# Patient Record
Sex: MALE | URBAN - METROPOLITAN AREA
[De-identification: names, ages, dates, MRNs, and addresses within clinical notes are randomized per-mention and may not be internally consistent; named-entity substitution may affect disease eponyms.]

---

## 2022-08-04 ENCOUNTER — APPOINTMENT (RX ONLY)
Dept: URBAN - METROPOLITAN AREA CLINIC 31 | Facility: CLINIC | Age: 37
Setting detail: DERMATOLOGY
End: 2022-08-04

## 2022-08-04 DIAGNOSIS — D18.0 HEMANGIOMA: ICD-10-CM

## 2022-08-04 DIAGNOSIS — B35.6 TINEA CRURIS: ICD-10-CM

## 2022-08-04 DIAGNOSIS — Z71.89 OTHER SPECIFIED COUNSELING: ICD-10-CM

## 2022-08-04 DIAGNOSIS — B37.42 CANDIDAL BALANITIS: ICD-10-CM

## 2022-08-04 DIAGNOSIS — L82.1 OTHER SEBORRHEIC KERATOSIS: ICD-10-CM

## 2022-08-04 DIAGNOSIS — L81.4 OTHER MELANIN HYPERPIGMENTATION: ICD-10-CM

## 2022-08-04 DIAGNOSIS — B07.8 OTHER VIRAL WARTS: ICD-10-CM

## 2022-08-04 PROBLEM — D18.01 HEMANGIOMA OF SKIN AND SUBCUTANEOUS TISSUE: Status: ACTIVE | Noted: 2022-08-04

## 2022-08-04 PROBLEM — D23.5 OTHER BENIGN NEOPLASM OF SKIN OF TRUNK: Status: ACTIVE | Noted: 2022-08-04

## 2022-08-04 PROBLEM — D29.4 BENIGN NEOPLASM OF SCROTUM: Status: ACTIVE | Noted: 2022-08-04

## 2022-08-04 PROBLEM — D23.62 OTHER BENIGN NEOPLASM OF SKIN OF LEFT UPPER LIMB, INCLUDING SHOULDER: Status: ACTIVE | Noted: 2022-08-04

## 2022-08-04 PROBLEM — D48.5 NEOPLASM OF UNCERTAIN BEHAVIOR OF SKIN: Status: ACTIVE | Noted: 2022-08-04

## 2022-08-04 PROCEDURE — ? ADDITIONAL NOTES

## 2022-08-04 PROCEDURE — 99203 OFFICE O/P NEW LOW 30 MIN: CPT

## 2022-08-04 PROCEDURE — ? COUNSELING

## 2022-08-04 PROCEDURE — ? PRESCRIPTION

## 2022-08-04 RX ORDER — KETOCONAZOLE 20 MG/G
CREAM TOPICAL BID
Qty: 60 | Refills: 6 | Status: ERX | COMMUNITY
Start: 2022-08-04

## 2022-08-04 RX ADMIN — KETOCONAZOLE: 20 CREAM TOPICAL at 00:00

## 2022-08-04 ASSESSMENT — LOCATION ZONE DERM
LOCATION ZONE: NOSE
LOCATION ZONE: TRUNK
LOCATION ZONE: PENIS

## 2022-08-04 ASSESSMENT — LOCATION DETAILED DESCRIPTION DERM
LOCATION DETAILED: RIGHT INFERIOR UPPER BACK
LOCATION DETAILED: RIGHT SUPERIOR UPPER BACK
LOCATION DETAILED: DORSAL CORONAL SULCUS
LOCATION DETAILED: RIGHT NARIS

## 2022-08-04 ASSESSMENT — LOCATION SIMPLE DESCRIPTION DERM
LOCATION SIMPLE: RIGHT NOSE
LOCATION SIMPLE: PENIS
LOCATION SIMPLE: RIGHT UPPER BACK

## 2022-08-04 NOTE — PROCEDURE: ADDITIONAL NOTES
Render Risk Assessment In Note?: no
Additional Notes: Patient was instructed to see an ENT to have this lesion evaluated, to rule out skin cancer. Pt verbalized his understanding of and agreement with this plan.
Detail Level: Simple

## 2023-08-28 ENCOUNTER — APPOINTMENT (OUTPATIENT)
Dept: RADIOLOGY | Facility: IMAGING CENTER | Age: 38
End: 2023-08-28
Attending: PHYSICIAN ASSISTANT
Payer: COMMERCIAL

## 2023-08-28 ENCOUNTER — OFFICE VISIT (OUTPATIENT)
Dept: URGENT CARE | Facility: CLINIC | Age: 38
End: 2023-08-28
Payer: COMMERCIAL

## 2023-08-28 VITALS
SYSTOLIC BLOOD PRESSURE: 136 MMHG | RESPIRATION RATE: 14 BRPM | DIASTOLIC BLOOD PRESSURE: 88 MMHG | TEMPERATURE: 97.9 F | BODY MASS INDEX: 35.79 KG/M2 | OXYGEN SATURATION: 97 % | HEART RATE: 87 BPM | HEIGHT: 70 IN | WEIGHT: 250 LBS

## 2023-08-28 DIAGNOSIS — M79.644 THUMB PAIN, RIGHT: ICD-10-CM

## 2023-08-28 DIAGNOSIS — S62.639A CLOSED FRACTURE OF TUFT OF DISTAL PHALANX OF FINGER: ICD-10-CM

## 2023-08-28 PROCEDURE — 3079F DIAST BP 80-89 MM HG: CPT | Performed by: PHYSICIAN ASSISTANT

## 2023-08-28 PROCEDURE — 3075F SYST BP GE 130 - 139MM HG: CPT | Performed by: PHYSICIAN ASSISTANT

## 2023-08-28 PROCEDURE — 99203 OFFICE O/P NEW LOW 30 MIN: CPT | Performed by: PHYSICIAN ASSISTANT

## 2023-08-28 PROCEDURE — 73140 X-RAY EXAM OF FINGER(S): CPT | Mod: TC,RT | Performed by: PHYSICIAN ASSISTANT

## 2023-08-28 RX ORDER — AMOXICILLIN AND CLAVULANATE POTASSIUM 875; 125 MG/1; MG/1
1 TABLET, FILM COATED ORAL 2 TIMES DAILY
Qty: 14 TABLET | Refills: 0 | Status: SHIPPED | OUTPATIENT
Start: 2023-08-28 | End: 2023-09-04

## 2023-08-28 ASSESSMENT — ENCOUNTER SYMPTOMS: NEUROLOGICAL NEGATIVE: 1

## 2023-08-28 NOTE — PROGRESS NOTES
"  Subjective:     Carlos Whyte  is a 37 y.o. male who presents for Finger Injury (X1day, States he was sword fighting, blunt wooden sword, right thumb, states it did bleed at the time, painful )       He presents today with right thumb injury that occurred roughly 20-22 hours ago.  States that he was performing martial arts activities when a blunt wooden sword did strike him directly on the right thumb.  He was wearing gloves at the time but did begin to experience immediate pain and discomfort.  Pain is present over the proximal shaft of the thumb as well as at the tip of the thumb.  There was a subungual hematoma present.  He denies numbness or tingling.  Has limited thumb range of motion but relates this to the localized swelling over the digit     Review of Systems   Musculoskeletal:         Right thumb pain   Skin:         Subungual hematoma over the right thumb   Neurological: Negative.       No Known Allergies  History reviewed. No pertinent past medical history.     Objective:   /88   Pulse 87   Temp 36.6 °C (97.9 °F) (Temporal)   Resp 14   Ht 1.778 m (5' 10\")   Wt 113 kg (250 lb)   SpO2 97%   BMI 35.87 kg/m²   Physical Exam  Vitals and nursing note reviewed.   Constitutional:       General: He is not in acute distress.     Appearance: He is not ill-appearing or toxic-appearing.   HENT:      Head: Normocephalic.      Nose: No rhinorrhea.   Eyes:      General: No scleral icterus.     Conjunctiva/sclera: Conjunctivae normal.   Pulmonary:      Effort: Pulmonary effort is normal. No respiratory distress.      Breath sounds: No stridor.   Musculoskeletal:      Cervical back: Neck supple.      Comments: Examination of the right thumb does reveal tenderness palpation over the shaft of the proximal phalanx as well as tenderness over the distal phalanx.  No obvious bony step-off deformities noted.  Subungual hematoma present underneath the nail.   Neurological:      Mental Status: He is alert and " oriented to person, place, and time.   Psychiatric:         Mood and Affect: Mood normal.         Behavior: Behavior normal.         Thought Content: Thought content normal.         Judgment: Judgment normal.           Diagnostic testing:    Right thumb x-ray series  Radiologist IMPRESSION:     Acute small minimally displaced first distal phalangeal tuft fracture.    Assessment/Plan:     Encounter Diagnoses   Name Primary?    Closed fracture of tuft of distal phalanx of finger     Thumb pain, right         Plan for care for today's complaint includes starting the patient on Augmentin for tuft fracture.  Referral placed to orthopedics for tuft fracture.  Patient was placed in static thumb splint today.  Continue to monitor symptoms and return to urgent care or follow-up with primary care provider if symptoms remain ongoing.  Follow-up in the emergency department if symptoms become severe, ER precautions discussed in office today..  Prescription for Augmentin provided.    See AVS Instructions below for written guidance provided to patient on after-visit management and care in addition to our verbal discussion during the visit.    Please note that this dictation was created using voice recognition software. I have attempted to correct all errors, but there may be sound-alike, spelling, grammar and possibly content errors that I did not discover before finalizing the note.    Tom Fall PA-C

## 2023-09-25 PROBLEM — S62.521B OPEN DISPLACED FRACTURE OF DISTAL PHALANX OF RIGHT THUMB: Status: ACTIVE | Noted: 2023-09-25

## 2023-11-06 PROBLEM — M79.641 RIGHT HAND PAIN: Status: ACTIVE | Noted: 2023-11-06

## 2024-06-06 ENCOUNTER — ANESTHESIA EVENT (OUTPATIENT)
Dept: SURGERY | Facility: MEDICAL CENTER | Age: 39
End: 2024-06-06
Payer: COMMERCIAL

## 2024-06-06 ENCOUNTER — HOSPITAL ENCOUNTER (OUTPATIENT)
Facility: MEDICAL CENTER | Age: 39
End: 2024-06-07
Attending: STUDENT IN AN ORGANIZED HEALTH CARE EDUCATION/TRAINING PROGRAM | Admitting: SURGERY
Payer: COMMERCIAL

## 2024-06-06 ENCOUNTER — APPOINTMENT (OUTPATIENT)
Dept: RADIOLOGY | Facility: MEDICAL CENTER | Age: 39
End: 2024-06-06
Attending: STUDENT IN AN ORGANIZED HEALTH CARE EDUCATION/TRAINING PROGRAM
Payer: COMMERCIAL

## 2024-06-06 ENCOUNTER — ANESTHESIA (OUTPATIENT)
Dept: SURGERY | Facility: MEDICAL CENTER | Age: 39
End: 2024-06-06
Payer: COMMERCIAL

## 2024-06-06 DIAGNOSIS — K35.80 ACUTE APPENDICITIS, UNSPECIFIED ACUTE APPENDICITIS TYPE: ICD-10-CM

## 2024-06-06 DIAGNOSIS — G89.18 POSTOPERATIVE PAIN: ICD-10-CM

## 2024-06-06 LAB
ALBUMIN SERPL BCP-MCNC: 4.1 G/DL (ref 3.2–4.9)
ALBUMIN/GLOB SERPL: 1.2 G/DL
ALP SERPL-CCNC: 103 U/L (ref 30–99)
ALT SERPL-CCNC: 22 U/L (ref 2–50)
ANION GAP SERPL CALC-SCNC: 14 MMOL/L (ref 7–16)
APPEARANCE UR: CLEAR
AST SERPL-CCNC: 13 U/L (ref 12–45)
BACTERIA #/AREA URNS HPF: NEGATIVE /HPF
BASOPHILS # BLD AUTO: 0.2 % (ref 0–1.8)
BASOPHILS # BLD: 0.04 K/UL (ref 0–0.12)
BILIRUB SERPL-MCNC: 0.9 MG/DL (ref 0.1–1.5)
BILIRUB UR QL STRIP.AUTO: NEGATIVE
BUN SERPL-MCNC: 14 MG/DL (ref 8–22)
CALCIUM ALBUM COR SERPL-MCNC: 9 MG/DL (ref 8.5–10.5)
CALCIUM SERPL-MCNC: 9.1 MG/DL (ref 8.4–10.2)
CHLORIDE SERPL-SCNC: 102 MMOL/L (ref 96–112)
CO2 SERPL-SCNC: 22 MMOL/L (ref 20–33)
COLOR UR: YELLOW
CREAT SERPL-MCNC: 0.86 MG/DL (ref 0.5–1.4)
EOSINOPHIL # BLD AUTO: 0 K/UL (ref 0–0.51)
EOSINOPHIL NFR BLD: 0 % (ref 0–6.9)
EPI CELLS #/AREA URNS HPF: NEGATIVE /HPF
ERYTHROCYTE [DISTWIDTH] IN BLOOD BY AUTOMATED COUNT: 39.1 FL (ref 35.9–50)
GFR SERPLBLD CREATININE-BSD FMLA CKD-EPI: 112 ML/MIN/1.73 M 2
GLOBULIN SER CALC-MCNC: 3.3 G/DL (ref 1.9–3.5)
GLUCOSE SERPL-MCNC: 108 MG/DL (ref 65–99)
GLUCOSE UR STRIP.AUTO-MCNC: NEGATIVE MG/DL
HCT VFR BLD AUTO: 46.7 % (ref 42–52)
HGB BLD-MCNC: 15.8 G/DL (ref 14–18)
IMM GRANULOCYTES # BLD AUTO: 0.11 K/UL (ref 0–0.11)
IMM GRANULOCYTES NFR BLD AUTO: 0.6 % (ref 0–0.9)
KETONES UR STRIP.AUTO-MCNC: NEGATIVE MG/DL
LEUKOCYTE ESTERASE UR QL STRIP.AUTO: NEGATIVE
LIPASE SERPL-CCNC: 15 U/L (ref 11–82)
LYMPHOCYTES # BLD AUTO: 1.32 K/UL (ref 1–4.8)
LYMPHOCYTES NFR BLD: 7.3 % (ref 22–41)
MCH RBC QN AUTO: 30 PG (ref 27–33)
MCHC RBC AUTO-ENTMCNC: 33.8 G/DL (ref 32.3–36.5)
MCV RBC AUTO: 88.6 FL (ref 81.4–97.8)
MICRO URNS: ABNORMAL
MONOCYTES # BLD AUTO: 1.1 K/UL (ref 0–0.85)
MONOCYTES NFR BLD AUTO: 6.1 % (ref 0–13.4)
MUCOUS THREADS #/AREA URNS HPF: ABNORMAL /HPF
NEUTROPHILS # BLD AUTO: 15.53 K/UL (ref 1.82–7.42)
NEUTROPHILS NFR BLD: 85.8 % (ref 44–72)
NITRITE UR QL STRIP.AUTO: NEGATIVE
NRBC # BLD AUTO: 0 K/UL
NRBC BLD-RTO: 0 /100 WBC (ref 0–0.2)
PATHOLOGY CONSULT NOTE: NORMAL
PH UR STRIP.AUTO: 5.5 [PH] (ref 5–8)
PLATELET # BLD AUTO: 294 K/UL (ref 164–446)
PMV BLD AUTO: 9.9 FL (ref 9–12.9)
POTASSIUM SERPL-SCNC: 3.8 MMOL/L (ref 3.6–5.5)
PROT SERPL-MCNC: 7.4 G/DL (ref 6–8.2)
PROT UR QL STRIP: NEGATIVE MG/DL
RBC # BLD AUTO: 5.27 M/UL (ref 4.7–6.1)
RBC # URNS HPF: ABNORMAL /HPF
RBC UR QL AUTO: ABNORMAL
SODIUM SERPL-SCNC: 138 MMOL/L (ref 135–145)
SP GR UR STRIP.AUTO: >=1.03
UNIDENT CRYS URNS QL MICRO: ABNORMAL /HPF
WBC # BLD AUTO: 18.1 K/UL (ref 4.8–10.8)
WBC #/AREA URNS HPF: ABNORMAL /HPF

## 2024-06-06 PROCEDURE — 160035 HCHG PACU - 1ST 60 MINS PHASE I: Performed by: SURGERY

## 2024-06-06 PROCEDURE — 160029 HCHG SURGERY MINUTES - 1ST 30 MINS LEVEL 4: Performed by: SURGERY

## 2024-06-06 PROCEDURE — 700105 HCHG RX REV CODE 258: Performed by: ANESTHESIOLOGY

## 2024-06-06 PROCEDURE — 87102 FUNGUS ISOLATION CULTURE: CPT

## 2024-06-06 PROCEDURE — G0378 HOSPITAL OBSERVATION PER HR: HCPCS

## 2024-06-06 PROCEDURE — 700105 HCHG RX REV CODE 258: Performed by: STUDENT IN AN ORGANIZED HEALTH CARE EDUCATION/TRAINING PROGRAM

## 2024-06-06 PROCEDURE — 160048 HCHG OR STATISTICAL LEVEL 1-5: Performed by: SURGERY

## 2024-06-06 PROCEDURE — 700105 HCHG RX REV CODE 258: Performed by: SURGERY

## 2024-06-06 PROCEDURE — 700111 HCHG RX REV CODE 636 W/ 250 OVERRIDE (IP): Performed by: ANESTHESIOLOGY

## 2024-06-06 PROCEDURE — 96375 TX/PRO/DX INJ NEW DRUG ADDON: CPT

## 2024-06-06 PROCEDURE — 700101 HCHG RX REV CODE 250: Performed by: SURGERY

## 2024-06-06 PROCEDURE — 160009 HCHG ANES TIME/MIN: Performed by: SURGERY

## 2024-06-06 PROCEDURE — 94760 N-INVAS EAR/PLS OXIMETRY 1: CPT

## 2024-06-06 PROCEDURE — 700111 HCHG RX REV CODE 636 W/ 250 OVERRIDE (IP): Mod: JZ | Performed by: ANESTHESIOLOGY

## 2024-06-06 PROCEDURE — 88304 TISSUE EXAM BY PATHOLOGIST: CPT

## 2024-06-06 PROCEDURE — 160041 HCHG SURGERY MINUTES - EA ADDL 1 MIN LEVEL 4: Performed by: SURGERY

## 2024-06-06 PROCEDURE — 700111 HCHG RX REV CODE 636 W/ 250 OVERRIDE (IP): Mod: JZ | Performed by: STUDENT IN AN ORGANIZED HEALTH CARE EDUCATION/TRAINING PROGRAM

## 2024-06-06 PROCEDURE — 700111 HCHG RX REV CODE 636 W/ 250 OVERRIDE (IP): Mod: JZ | Performed by: SURGERY

## 2024-06-06 PROCEDURE — 700117 HCHG RX CONTRAST REV CODE 255: Performed by: STUDENT IN AN ORGANIZED HEALTH CARE EDUCATION/TRAINING PROGRAM

## 2024-06-06 PROCEDURE — 83690 ASSAY OF LIPASE: CPT

## 2024-06-06 PROCEDURE — 160002 HCHG RECOVERY MINUTES (STAT): Performed by: SURGERY

## 2024-06-06 PROCEDURE — 80053 COMPREHEN METABOLIC PANEL: CPT

## 2024-06-06 PROCEDURE — 74177 CT ABD & PELVIS W/CONTRAST: CPT

## 2024-06-06 PROCEDURE — 87070 CULTURE OTHR SPECIMN AEROBIC: CPT

## 2024-06-06 PROCEDURE — 87205 SMEAR GRAM STAIN: CPT

## 2024-06-06 PROCEDURE — 96365 THER/PROPH/DIAG IV INF INIT: CPT

## 2024-06-06 PROCEDURE — 81001 URINALYSIS AUTO W/SCOPE: CPT

## 2024-06-06 PROCEDURE — 99291 CRITICAL CARE FIRST HOUR: CPT

## 2024-06-06 PROCEDURE — 87075 CULTR BACTERIA EXCEPT BLOOD: CPT

## 2024-06-06 PROCEDURE — 85025 COMPLETE CBC W/AUTO DIFF WBC: CPT

## 2024-06-06 PROCEDURE — 36415 COLL VENOUS BLD VENIPUNCTURE: CPT

## 2024-06-06 PROCEDURE — 700101 HCHG RX REV CODE 250: Performed by: ANESTHESIOLOGY

## 2024-06-06 RX ORDER — HYDROMORPHONE HYDROCHLORIDE 1 MG/ML
0.5 INJECTION, SOLUTION INTRAMUSCULAR; INTRAVENOUS; SUBCUTANEOUS
Status: DISCONTINUED | OUTPATIENT
Start: 2024-06-06 | End: 2024-06-07 | Stop reason: HOSPADM

## 2024-06-06 RX ORDER — EPHEDRINE SULFATE 50 MG/ML
5 INJECTION, SOLUTION INTRAVENOUS
Status: DISCONTINUED | OUTPATIENT
Start: 2024-06-06 | End: 2024-06-06 | Stop reason: HOSPADM

## 2024-06-06 RX ORDER — HYDRALAZINE HYDROCHLORIDE 20 MG/ML
5 INJECTION INTRAMUSCULAR; INTRAVENOUS
Status: DISCONTINUED | OUTPATIENT
Start: 2024-06-06 | End: 2024-06-06 | Stop reason: HOSPADM

## 2024-06-06 RX ORDER — BUPIVACAINE HYDROCHLORIDE AND EPINEPHRINE 5; 5 MG/ML; UG/ML
INJECTION, SOLUTION EPIDURAL; INTRACAUDAL; PERINEURAL
Status: DISCONTINUED | OUTPATIENT
Start: 2024-06-06 | End: 2024-06-06 | Stop reason: HOSPADM

## 2024-06-06 RX ORDER — SODIUM CHLORIDE, SODIUM LACTATE, POTASSIUM CHLORIDE, CALCIUM CHLORIDE 600; 310; 30; 20 MG/100ML; MG/100ML; MG/100ML; MG/100ML
INJECTION, SOLUTION INTRAVENOUS CONTINUOUS
Status: DISCONTINUED | OUTPATIENT
Start: 2024-06-06 | End: 2024-06-07 | Stop reason: HOSPADM

## 2024-06-06 RX ORDER — METRONIDAZOLE 500 MG/100ML
500 INJECTION, SOLUTION INTRAVENOUS EVERY 12 HOURS
Qty: 600 ML | Refills: 0 | Status: DISCONTINUED | OUTPATIENT
Start: 2024-06-07 | End: 2024-06-07 | Stop reason: HOSPADM

## 2024-06-06 RX ORDER — OXYCODONE HYDROCHLORIDE 5 MG/1
5 TABLET ORAL
Status: DISCONTINUED | OUTPATIENT
Start: 2024-06-06 | End: 2024-06-07 | Stop reason: HOSPADM

## 2024-06-06 RX ORDER — ONDANSETRON 4 MG/1
4 TABLET, ORALLY DISINTEGRATING ORAL EVERY 4 HOURS PRN
Status: DISCONTINUED | OUTPATIENT
Start: 2024-06-06 | End: 2024-06-07 | Stop reason: HOSPADM

## 2024-06-06 RX ORDER — POLYETHYLENE GLYCOL 3350 17 G/17G
1 POWDER, FOR SOLUTION ORAL 2 TIMES DAILY
Status: DISCONTINUED | OUTPATIENT
Start: 2024-06-06 | End: 2024-06-07 | Stop reason: HOSPADM

## 2024-06-06 RX ORDER — OXYCODONE HYDROCHLORIDE 10 MG/1
10 TABLET ORAL
Status: DISCONTINUED | OUTPATIENT
Start: 2024-06-06 | End: 2024-06-07 | Stop reason: HOSPADM

## 2024-06-06 RX ORDER — ONDANSETRON 2 MG/ML
INJECTION INTRAMUSCULAR; INTRAVENOUS PRN
Status: DISCONTINUED | OUTPATIENT
Start: 2024-06-06 | End: 2024-06-06 | Stop reason: SURG

## 2024-06-06 RX ORDER — DOCUSATE SODIUM 100 MG/1
100 CAPSULE, LIQUID FILLED ORAL 2 TIMES DAILY
Status: DISCONTINUED | OUTPATIENT
Start: 2024-06-06 | End: 2024-06-07 | Stop reason: HOSPADM

## 2024-06-06 RX ORDER — SODIUM CHLORIDE, SODIUM LACTATE, POTASSIUM CHLORIDE, CALCIUM CHLORIDE 600; 310; 30; 20 MG/100ML; MG/100ML; MG/100ML; MG/100ML
1000 INJECTION, SOLUTION INTRAVENOUS ONCE
Status: COMPLETED | OUTPATIENT
Start: 2024-06-06 | End: 2024-06-06

## 2024-06-06 RX ORDER — AMOXICILLIN 250 MG
1 CAPSULE ORAL
Status: DISCONTINUED | OUTPATIENT
Start: 2024-06-06 | End: 2024-06-07 | Stop reason: HOSPADM

## 2024-06-06 RX ORDER — MEPERIDINE HYDROCHLORIDE 25 MG/ML
6.25 INJECTION INTRAMUSCULAR; INTRAVENOUS; SUBCUTANEOUS
Status: DISCONTINUED | OUTPATIENT
Start: 2024-06-06 | End: 2024-06-06 | Stop reason: HOSPADM

## 2024-06-06 RX ORDER — CEFTRIAXONE 2 G/1
2000 INJECTION, POWDER, FOR SOLUTION INTRAMUSCULAR; INTRAVENOUS ONCE
Status: COMPLETED | OUTPATIENT
Start: 2024-06-06 | End: 2024-06-06

## 2024-06-06 RX ORDER — IBUPROFEN 400 MG/1
800 TABLET ORAL 3 TIMES DAILY PRN
Status: DISCONTINUED | OUTPATIENT
Start: 2024-06-10 | End: 2024-06-07 | Stop reason: HOSPADM

## 2024-06-06 RX ORDER — SODIUM CHLORIDE, SODIUM LACTATE, POTASSIUM CHLORIDE, CALCIUM CHLORIDE 600; 310; 30; 20 MG/100ML; MG/100ML; MG/100ML; MG/100ML
INJECTION, SOLUTION INTRAVENOUS CONTINUOUS
Status: DISCONTINUED | OUTPATIENT
Start: 2024-06-06 | End: 2024-06-06

## 2024-06-06 RX ORDER — OXYCODONE HCL 5 MG/5 ML
5 SOLUTION, ORAL ORAL
Status: DISCONTINUED | OUTPATIENT
Start: 2024-06-06 | End: 2024-06-06 | Stop reason: HOSPADM

## 2024-06-06 RX ORDER — KETOROLAC TROMETHAMINE 15 MG/ML
INJECTION, SOLUTION INTRAMUSCULAR; INTRAVENOUS PRN
Status: DISCONTINUED | OUTPATIENT
Start: 2024-06-06 | End: 2024-06-06 | Stop reason: SURG

## 2024-06-06 RX ORDER — HYDROMORPHONE HYDROCHLORIDE 1 MG/ML
0.4 INJECTION, SOLUTION INTRAMUSCULAR; INTRAVENOUS; SUBCUTANEOUS
Status: DISCONTINUED | OUTPATIENT
Start: 2024-06-06 | End: 2024-06-06 | Stop reason: HOSPADM

## 2024-06-06 RX ORDER — ACETAMINOPHEN 325 MG/1
650 TABLET ORAL EVERY 6 HOURS PRN
Status: DISCONTINUED | OUTPATIENT
Start: 2024-06-12 | End: 2024-06-07 | Stop reason: HOSPADM

## 2024-06-06 RX ORDER — ENOXAPARIN SODIUM 100 MG/ML
40 INJECTION SUBCUTANEOUS DAILY
Status: DISCONTINUED | OUTPATIENT
Start: 2024-06-07 | End: 2024-06-07 | Stop reason: HOSPADM

## 2024-06-06 RX ORDER — LABETALOL HYDROCHLORIDE 5 MG/ML
5 INJECTION, SOLUTION INTRAVENOUS
Status: DISCONTINUED | OUTPATIENT
Start: 2024-06-06 | End: 2024-06-06 | Stop reason: HOSPADM

## 2024-06-06 RX ORDER — METRONIDAZOLE 500 MG/100ML
500 INJECTION, SOLUTION INTRAVENOUS ONCE
Status: COMPLETED | OUTPATIENT
Start: 2024-06-06 | End: 2024-06-06

## 2024-06-06 RX ORDER — ACETAMINOPHEN 325 MG/1
650 TABLET ORAL EVERY 6 HOURS
Status: DISCONTINUED | OUTPATIENT
Start: 2024-06-07 | End: 2024-06-07 | Stop reason: HOSPADM

## 2024-06-06 RX ORDER — AMOXICILLIN 250 MG
1 CAPSULE ORAL NIGHTLY
Status: DISCONTINUED | OUTPATIENT
Start: 2024-06-06 | End: 2024-06-07 | Stop reason: HOSPADM

## 2024-06-06 RX ORDER — DIPHENHYDRAMINE HYDROCHLORIDE 50 MG/ML
12.5 INJECTION INTRAMUSCULAR; INTRAVENOUS
Status: DISCONTINUED | OUTPATIENT
Start: 2024-06-06 | End: 2024-06-06 | Stop reason: HOSPADM

## 2024-06-06 RX ORDER — HYDROMORPHONE HYDROCHLORIDE 1 MG/ML
0.1 INJECTION, SOLUTION INTRAMUSCULAR; INTRAVENOUS; SUBCUTANEOUS
Status: DISCONTINUED | OUTPATIENT
Start: 2024-06-06 | End: 2024-06-06 | Stop reason: HOSPADM

## 2024-06-06 RX ORDER — ONDANSETRON 2 MG/ML
4 INJECTION INTRAMUSCULAR; INTRAVENOUS ONCE
Status: COMPLETED | OUTPATIENT
Start: 2024-06-06 | End: 2024-06-06

## 2024-06-06 RX ORDER — KETOROLAC TROMETHAMINE 15 MG/ML
15 INJECTION, SOLUTION INTRAMUSCULAR; INTRAVENOUS EVERY 6 HOURS
Status: DISCONTINUED | OUTPATIENT
Start: 2024-06-07 | End: 2024-06-07 | Stop reason: HOSPADM

## 2024-06-06 RX ORDER — HALOPERIDOL 5 MG/ML
1 INJECTION INTRAMUSCULAR
Status: DISCONTINUED | OUTPATIENT
Start: 2024-06-06 | End: 2024-06-06 | Stop reason: HOSPADM

## 2024-06-06 RX ORDER — DEXAMETHASONE SODIUM PHOSPHATE 4 MG/ML
INJECTION, SOLUTION INTRA-ARTICULAR; INTRALESIONAL; INTRAMUSCULAR; INTRAVENOUS; SOFT TISSUE PRN
Status: DISCONTINUED | OUTPATIENT
Start: 2024-06-06 | End: 2024-06-06 | Stop reason: SURG

## 2024-06-06 RX ORDER — ONDANSETRON 2 MG/ML
4 INJECTION INTRAMUSCULAR; INTRAVENOUS
Status: DISCONTINUED | OUTPATIENT
Start: 2024-06-06 | End: 2024-06-06 | Stop reason: HOSPADM

## 2024-06-06 RX ORDER — ONDANSETRON 2 MG/ML
4 INJECTION INTRAMUSCULAR; INTRAVENOUS EVERY 4 HOURS PRN
Status: DISCONTINUED | OUTPATIENT
Start: 2024-06-06 | End: 2024-06-07 | Stop reason: HOSPADM

## 2024-06-06 RX ORDER — ACETAMINOPHEN 10 MG/ML
1000 INJECTION, SOLUTION INTRAVENOUS ONCE
Status: COMPLETED | OUTPATIENT
Start: 2024-06-06 | End: 2024-06-06

## 2024-06-06 RX ORDER — LIDOCAINE HYDROCHLORIDE 20 MG/ML
INJECTION, SOLUTION EPIDURAL; INFILTRATION; INTRACAUDAL; PERINEURAL PRN
Status: DISCONTINUED | OUTPATIENT
Start: 2024-06-06 | End: 2024-06-06 | Stop reason: SURG

## 2024-06-06 RX ORDER — HYDROMORPHONE HYDROCHLORIDE 1 MG/ML
0.2 INJECTION, SOLUTION INTRAMUSCULAR; INTRAVENOUS; SUBCUTANEOUS
Status: DISCONTINUED | OUTPATIENT
Start: 2024-06-06 | End: 2024-06-06 | Stop reason: HOSPADM

## 2024-06-06 RX ORDER — CEFOTETAN DISODIUM 2 G/20ML
INJECTION, POWDER, FOR SOLUTION INTRAMUSCULAR; INTRAVENOUS PRN
Status: DISCONTINUED | OUTPATIENT
Start: 2024-06-06 | End: 2024-06-06 | Stop reason: SURG

## 2024-06-06 RX ORDER — OXYCODONE HCL 5 MG/5 ML
10 SOLUTION, ORAL ORAL
Status: DISCONTINUED | OUTPATIENT
Start: 2024-06-06 | End: 2024-06-06 | Stop reason: HOSPADM

## 2024-06-06 RX ORDER — ROCURONIUM BROMIDE 10 MG/ML
INJECTION, SOLUTION INTRAVENOUS PRN
Status: DISCONTINUED | OUTPATIENT
Start: 2024-06-06 | End: 2024-06-06 | Stop reason: SURG

## 2024-06-06 RX ORDER — ENEMA 19; 7 G/133ML; G/133ML
1 ENEMA RECTAL
Status: DISCONTINUED | OUTPATIENT
Start: 2024-06-06 | End: 2024-06-07 | Stop reason: HOSPADM

## 2024-06-06 RX ORDER — SODIUM CHLORIDE, SODIUM LACTATE, POTASSIUM CHLORIDE, CALCIUM CHLORIDE 600; 310; 30; 20 MG/100ML; MG/100ML; MG/100ML; MG/100ML
INJECTION, SOLUTION INTRAVENOUS CONTINUOUS
Status: DISCONTINUED | OUTPATIENT
Start: 2024-06-06 | End: 2024-06-06 | Stop reason: HOSPADM

## 2024-06-06 RX ORDER — BISACODYL 10 MG
10 SUPPOSITORY, RECTAL RECTAL
Status: DISCONTINUED | OUTPATIENT
Start: 2024-06-06 | End: 2024-06-07 | Stop reason: HOSPADM

## 2024-06-06 RX ADMIN — KETOROLAC TROMETHAMINE 15 MG: 15 INJECTION, SOLUTION INTRAMUSCULAR; INTRAVENOUS at 23:20

## 2024-06-06 RX ADMIN — SODIUM CHLORIDE, POTASSIUM CHLORIDE, SODIUM LACTATE AND CALCIUM CHLORIDE: 600; 310; 30; 20 INJECTION, SOLUTION INTRAVENOUS at 23:11

## 2024-06-06 RX ADMIN — FENTANYL CITRATE 50 MCG: 50 INJECTION, SOLUTION INTRAMUSCULAR; INTRAVENOUS at 21:50

## 2024-06-06 RX ADMIN — FENTANYL CITRATE 50 MCG: 50 INJECTION, SOLUTION INTRAMUSCULAR; INTRAVENOUS at 20:02

## 2024-06-06 RX ADMIN — SODIUM CHLORIDE, POTASSIUM CHLORIDE, SODIUM LACTATE AND CALCIUM CHLORIDE 1000 ML: 600; 310; 30; 20 INJECTION, SOLUTION INTRAVENOUS at 16:03

## 2024-06-06 RX ADMIN — DEXAMETHASONE SODIUM PHOSPHATE 4 MG: 4 INJECTION INTRA-ARTICULAR; INTRALESIONAL; INTRAMUSCULAR; INTRAVENOUS; SOFT TISSUE at 21:28

## 2024-06-06 RX ADMIN — ROCURONIUM BROMIDE 70 MG: 50 INJECTION, SOLUTION INTRAVENOUS at 21:22

## 2024-06-06 RX ADMIN — SODIUM CHLORIDE, POTASSIUM CHLORIDE, SODIUM LACTATE AND CALCIUM CHLORIDE: 600; 310; 30; 20 INJECTION, SOLUTION INTRAVENOUS at 20:02

## 2024-06-06 RX ADMIN — IOHEXOL 100 ML: 350 INJECTION, SOLUTION INTRAVENOUS at 17:00

## 2024-06-06 RX ADMIN — PROPOFOL 200 MG: 10 INJECTION, EMULSION INTRAVENOUS at 21:22

## 2024-06-06 RX ADMIN — METRONIDAZOLE 500 MG: 5 INJECTION, SOLUTION INTRAVENOUS at 17:50

## 2024-06-06 RX ADMIN — ONDANSETRON 4 MG: 2 INJECTION INTRAMUSCULAR; INTRAVENOUS at 21:57

## 2024-06-06 RX ADMIN — CEFTRIAXONE SODIUM 2000 MG: 2 INJECTION, POWDER, FOR SOLUTION INTRAMUSCULAR; INTRAVENOUS at 17:49

## 2024-06-06 RX ADMIN — FENTANYL CITRATE 50 MCG: 50 INJECTION, SOLUTION INTRAMUSCULAR; INTRAVENOUS at 22:05

## 2024-06-06 RX ADMIN — ACETAMINOPHEN 1000 MG: 1000 INJECTION INTRAVENOUS at 15:47

## 2024-06-06 RX ADMIN — CEFOTETAN DISODIUM 2 G: 2 INJECTION, POWDER, FOR SOLUTION INTRAMUSCULAR; INTRAVENOUS at 21:24

## 2024-06-06 RX ADMIN — FENTANYL CITRATE 50 MCG: 50 INJECTION, SOLUTION INTRAMUSCULAR; INTRAVENOUS at 21:41

## 2024-06-06 RX ADMIN — ONDANSETRON 4 MG: 2 INJECTION INTRAMUSCULAR; INTRAVENOUS at 15:42

## 2024-06-06 RX ADMIN — LIDOCAINE HYDROCHLORIDE 100 MG: 20 INJECTION, SOLUTION EPIDURAL; INFILTRATION; INTRACAUDAL at 21:22

## 2024-06-06 RX ADMIN — FENTANYL CITRATE 100 MCG: 50 INJECTION, SOLUTION INTRAMUSCULAR; INTRAVENOUS at 21:20

## 2024-06-06 RX ADMIN — KETOROLAC TROMETHAMINE 15 MG: 15 INJECTION, SOLUTION INTRAMUSCULAR; INTRAVENOUS at 21:57

## 2024-06-06 ASSESSMENT — PAIN SCALES - GENERAL: PAIN_LEVEL: 3

## 2024-06-06 ASSESSMENT — COGNITIVE AND FUNCTIONAL STATUS - GENERAL
MOBILITY SCORE: 24
DAILY ACTIVITIY SCORE: 24
SUGGESTED CMS G CODE MODIFIER DAILY ACTIVITY: CH
SUGGESTED CMS G CODE MODIFIER MOBILITY: CH

## 2024-06-06 ASSESSMENT — PATIENT HEALTH QUESTIONNAIRE - PHQ9
2. FEELING DOWN, DEPRESSED, IRRITABLE, OR HOPELESS: NOT AT ALL
SUM OF ALL RESPONSES TO PHQ9 QUESTIONS 1 AND 2: 0
1. LITTLE INTEREST OR PLEASURE IN DOING THINGS: NOT AT ALL

## 2024-06-06 ASSESSMENT — FIBROSIS 4 INDEX: FIB4 SCORE: 0.37

## 2024-06-06 ASSESSMENT — LIFESTYLE VARIABLES
HAVE PEOPLE ANNOYED YOU BY CRITICIZING YOUR DRINKING: NO
HAVE YOU EVER FELT YOU SHOULD CUT DOWN ON YOUR DRINKING: NO
EVER HAD A DRINK FIRST THING IN THE MORNING TO STEADY YOUR NERVES TO GET RID OF A HANGOVER: NO
TOTAL SCORE: 0
HOW MANY TIMES IN THE PAST YEAR HAVE YOU HAD 5 OR MORE DRINKS IN A DAY: 0
CONSUMPTION TOTAL: NEGATIVE
TOTAL SCORE: 0
TOTAL SCORE: 0
EVER FELT BAD OR GUILTY ABOUT YOUR DRINKING: NO
ALCOHOL_USE: NO
ON A TYPICAL DAY WHEN YOU DRINK ALCOHOL HOW MANY DRINKS DO YOU HAVE: 0
AVERAGE NUMBER OF DAYS PER WEEK YOU HAVE A DRINK CONTAINING ALCOHOL: 0

## 2024-06-06 ASSESSMENT — COPD QUESTIONNAIRES
HAVE YOU SMOKED AT LEAST 100 CIGARETTES IN YOUR ENTIRE LIFE: NO/DON'T KNOW
DURING THE PAST 4 WEEKS HOW MUCH DID YOU FEEL SHORT OF BREATH: NONE/LITTLE OF THE TIME
DO YOU EVER COUGH UP ANY MUCUS OR PHLEGM?: NO/ONLY WITH OCCASIONAL COLDS OR INFECTIONS
COPD SCREENING SCORE: 0

## 2024-06-06 ASSESSMENT — PAIN DESCRIPTION - PAIN TYPE: TYPE: ACUTE PAIN

## 2024-06-06 NOTE — ED PROVIDER NOTES
ED Provider Note    CHIEF COMPLAINT  Chief Complaint   Patient presents with    Abdominal Pain     C/o generalized abd pain and more intense RLQ abd pain starting yesterday and has been progressively worsening. +N/V, + 3 emesis this afternoon, and 1x/hr from 8pm to 0400. +fever today 100f. No abd surgical hx.        EXTERNAL RECORDS REVIEWED  Outpatient Notes orthopedic surgery note from 11/6/2023 patient seen for pain of right thumb noted to have right thumb distal phalanx tuft fracture    HPI/ROS  LIMITATION TO HISTORY     OUTSIDE HISTORIAN(S):      Carlos Whyte is a 38 y.o. male who presents with abdominal pain.  Patient says that starting yesterday he had generalized crampy abdominal pain.  Patient says that throughout the afternoon and evening he had multiple episodes of nonbloody vomiting.  Patient says that the pain is now localized to the right lower side of his abdomen.  Patient says he had subjective fever at home.  Patient denies urinary changes, diarrhea, bloody stool.  Patient denies recent trauma.  Patient denies heavy alcohol use or drug use.  Patient denies previous abdominal surgeries.    PAST MEDICAL HISTORY       SURGICAL HISTORY  patient denies any surgical history    FAMILY HISTORY  History reviewed. No pertinent family history.    SOCIAL HISTORY  Social History     Tobacco Use    Smoking status: Never    Smokeless tobacco: Never   Vaping Use    Vaping status: Never Used   Substance and Sexual Activity    Alcohol use: Not Currently    Drug use: Never    Sexual activity: Not on file       CURRENT MEDICATIONS  Home Medications       Reviewed by Erma Bai, PharmD (Pharmacist) on 06/06/24 at 1828  Med List Status: Complete     Medication Last Dose Status   Ibuprofen-Acetaminophen 125-250 MG Tab PRN Active                  Audit from Redirected Encounters    **Home medications have not yet been reviewed for this encounter**         ALLERGIES  Allergies   Allergen Reactions    Milk-Related  "Compounds Unspecified     Stomach ache       PHYSICAL EXAM  VITAL SIGNS: /82   Pulse 86   Temp 36.6 °C (97.8 °F) (Temporal)   Resp 18   Ht 1.778 m (5' 10\")   Wt 121 kg (266 lb 1.5 oz)   SpO2 98%   BMI 38.18 kg/m²    Constitutional: Alert in no apparent distress.  HENT: No signs of trauma, Bilateral external ears normal, Nose normal.   Eyes: Pupils are equal and reactive, Conjunctiva normal, Non-icteric.   Neck: Normal range of motion, No tenderness, Supple, No stridor.   Cardiovascular: Regular rate and rhythm, no murmurs.   Thorax & Lungs: Normal breath sounds, No respiratory distress, No wheezing, No chest tenderness.   Abdomen: Bowel sounds normal, Soft, tenderness in right lower quadrant, no rebound tenderness or guarding  Skin: Warm, Dry, No erythema, No rash.   Back: No bony tenderness, No CVA tenderness.   Extremities: Intact distal pulses, No edema, No tenderness, No cyanosis  Musculoskeletal: Good range of motion in all major joints. No tenderness to palpation or major deformities noted.   Neurologic: Alert , Normal motor function, Normal sensory function, No focal deficits noted.       EKG/LABS  Labs Reviewed   CBC WITH DIFFERENTIAL - Abnormal; Notable for the following components:       Result Value    WBC 18.1 (*)     Neutrophils-Polys 85.80 (*)     Lymphocytes 7.30 (*)     Neutrophils (Absolute) 15.53 (*)     Monos (Absolute) 1.10 (*)     All other components within normal limits   COMP METABOLIC PANEL - Abnormal; Notable for the following components:    Glucose 108 (*)     Alkaline Phosphatase 103 (*)     All other components within normal limits   URINALYSIS - Abnormal; Notable for the following components:    Occult Blood Trace (*)     All other components within normal limits   URINE MICROSCOPIC (W/UA) - Abnormal; Notable for the following components:    WBC Rare (*)     All other components within normal limits   LIPASE   ESTIMATED GFR           RADIOLOGY/PROCEDURES   I have " independently interpreted the diagnostic imaging associated with this visit and am waiting the final reading from the radiologist.   My preliminary interpretation is as follows: Enlarged appendix    Radiologist interpretation:  CT-ABDOMEN-PELVIS WITH   Final Result      Dilated fluid-filled appendix with surrounding inflammatory change consistent with acute appendicitis.          COURSE & MEDICAL DECISION MAKING    ASSESSMENT, COURSE AND PLAN  Care Narrative: Vital signs within normal limits.  Patient has tenderness in right lower quadrant with earlier generalized pain that is now localized there.  Concern for appendicitis will obtain CT imaging and obtain blood work to assess for hematologic or metabolic derangements.  Will start with IV acetaminophen as patient would like to avoid opioid medications for the time being.  Will initiate IV fluids and antiemetics.  Differential also includes nephrolithiasis, diverticulitis.  Patient has no testicular swelling or pain concerning for testicular torsion.    CT imaging with signs of appendicitis.  Patient dosed with antibiotics.  Spoke with Dr. Haney from general surgery with plans to take patient to the operating room and will be admitting patient.  Patient and his wife updated.  Patient reports adequate pain control at this time.            ADDITIONAL PROBLEMS MANAGED      DISPOSITION AND DISCUSSIONS  I have discussed management of the patient with the following physicians and DOROTA's: General surgery      FINAL DIAGNOSIS  1. Acute appendicitis, unspecified acute appendicitis type           Electronically signed by: Francesco Bull D.O., 6/6/2024 3:20 PM

## 2024-06-06 NOTE — ED TRIAGE NOTES
"Chief Complaint   Patient presents with    Abdominal Pain     C/o generalized abd pain and more intense RLQ abd pain starting yesterday and has been progressively worsening. +N/V, + 3 emesis this afternoon, and 1x/hr from 8pm to 0400. +fever today 100f. No abd surgical hx.      /86   Pulse 91   Temp 36.6 °C (97.8 °F) (Temporal)   Resp 16   Ht 1.778 m (5' 10\")   Wt 121 kg (266 lb 1.5 oz)   SpO2 96%   BMI 38.18 kg/m²     "

## 2024-06-07 ENCOUNTER — PHARMACY VISIT (OUTPATIENT)
Dept: PHARMACY | Facility: MEDICAL CENTER | Age: 39
End: 2024-06-07
Payer: COMMERCIAL

## 2024-06-07 VITALS
BODY MASS INDEX: 38.51 KG/M2 | WEIGHT: 268.96 LBS | HEIGHT: 70 IN | OXYGEN SATURATION: 95 % | HEART RATE: 67 BPM | DIASTOLIC BLOOD PRESSURE: 79 MMHG | RESPIRATION RATE: 16 BRPM | TEMPERATURE: 98.6 F | SYSTOLIC BLOOD PRESSURE: 128 MMHG

## 2024-06-07 PROBLEM — G89.18 POSTOPERATIVE PAIN: Status: ACTIVE | Noted: 2024-06-07

## 2024-06-07 LAB
ANION GAP SERPL CALC-SCNC: 13 MMOL/L (ref 7–16)
BASOPHILS # BLD AUTO: 0.1 % (ref 0–1.8)
BASOPHILS # BLD: 0.02 K/UL (ref 0–0.12)
BUN SERPL-MCNC: 13 MG/DL (ref 8–22)
CALCIUM SERPL-MCNC: 8.6 MG/DL (ref 8.4–10.2)
CHLORIDE SERPL-SCNC: 103 MMOL/L (ref 96–112)
CO2 SERPL-SCNC: 23 MMOL/L (ref 20–33)
CREAT SERPL-MCNC: 0.95 MG/DL (ref 0.5–1.4)
EOSINOPHIL # BLD AUTO: 0 K/UL (ref 0–0.51)
EOSINOPHIL NFR BLD: 0 % (ref 0–6.9)
ERYTHROCYTE [DISTWIDTH] IN BLOOD BY AUTOMATED COUNT: 41.1 FL (ref 35.9–50)
GFR SERPLBLD CREATININE-BSD FMLA CKD-EPI: 104 ML/MIN/1.73 M 2
GLUCOSE SERPL-MCNC: 123 MG/DL (ref 65–99)
GRAM STN SPEC: NORMAL
HCT VFR BLD AUTO: 42.4 % (ref 42–52)
HGB BLD-MCNC: 14.5 G/DL (ref 14–18)
IMM GRANULOCYTES # BLD AUTO: 0.06 K/UL (ref 0–0.11)
IMM GRANULOCYTES NFR BLD AUTO: 0.4 % (ref 0–0.9)
LYMPHOCYTES # BLD AUTO: 0.65 K/UL (ref 1–4.8)
LYMPHOCYTES NFR BLD: 4.8 % (ref 22–41)
MCH RBC QN AUTO: 30.5 PG (ref 27–33)
MCHC RBC AUTO-ENTMCNC: 34.2 G/DL (ref 32.3–36.5)
MCV RBC AUTO: 89.3 FL (ref 81.4–97.8)
MONOCYTES # BLD AUTO: 0.57 K/UL (ref 0–0.85)
MONOCYTES NFR BLD AUTO: 4.2 % (ref 0–13.4)
NEUTROPHILS # BLD AUTO: 12.33 K/UL (ref 1.82–7.42)
NEUTROPHILS NFR BLD: 90.5 % (ref 44–72)
NRBC # BLD AUTO: 0 K/UL
NRBC BLD-RTO: 0 /100 WBC (ref 0–0.2)
PLATELET # BLD AUTO: 263 K/UL (ref 164–446)
PMV BLD AUTO: 10.4 FL (ref 9–12.9)
POTASSIUM SERPL-SCNC: 4 MMOL/L (ref 3.6–5.5)
RBC # BLD AUTO: 4.75 M/UL (ref 4.7–6.1)
SIGNIFICANT IND 70042: NORMAL
SITE SITE: NORMAL
SODIUM SERPL-SCNC: 139 MMOL/L (ref 135–145)
SOURCE SOURCE: NORMAL
WBC # BLD AUTO: 13.6 K/UL (ref 4.8–10.8)

## 2024-06-07 PROCEDURE — A9270 NON-COVERED ITEM OR SERVICE: HCPCS | Performed by: SURGERY

## 2024-06-07 PROCEDURE — G0378 HOSPITAL OBSERVATION PER HR: HCPCS

## 2024-06-07 PROCEDURE — 80048 BASIC METABOLIC PNL TOTAL CA: CPT

## 2024-06-07 PROCEDURE — 700111 HCHG RX REV CODE 636 W/ 250 OVERRIDE (IP): Mod: JZ | Performed by: SURGERY

## 2024-06-07 PROCEDURE — 96376 TX/PRO/DX INJ SAME DRUG ADON: CPT

## 2024-06-07 PROCEDURE — 700102 HCHG RX REV CODE 250 W/ 637 OVERRIDE(OP): Performed by: SURGERY

## 2024-06-07 PROCEDURE — RXMED WILLOW AMBULATORY MEDICATION CHARGE: Performed by: SURGERY

## 2024-06-07 PROCEDURE — 85025 COMPLETE CBC W/AUTO DIFF WBC: CPT

## 2024-06-07 PROCEDURE — 36415 COLL VENOUS BLD VENIPUNCTURE: CPT

## 2024-06-07 PROCEDURE — 96365 THER/PROPH/DIAG IV INF INIT: CPT

## 2024-06-07 RX ORDER — IBUPROFEN 600 MG/1
600 TABLET ORAL EVERY 6 HOURS
Qty: 45 TABLET | Refills: 0 | Status: SHIPPED | OUTPATIENT
Start: 2024-06-07

## 2024-06-07 RX ORDER — OXYCODONE HYDROCHLORIDE 5 MG/1
5 TABLET ORAL EVERY 4 HOURS PRN
Qty: 12 TABLET | Refills: 0 | Status: SHIPPED | OUTPATIENT
Start: 2024-06-07 | End: 2024-06-10

## 2024-06-07 RX ORDER — ACETAMINOPHEN 325 MG/1
650 TABLET ORAL EVERY 6 HOURS
Qty: 60 TABLET | Refills: 0 | Status: SHIPPED | OUTPATIENT
Start: 2024-06-07

## 2024-06-07 RX ORDER — POLYETHYLENE GLYCOL 3350 17 G/17G
17 POWDER, FOR SOLUTION ORAL DAILY
Qty: 20 EACH | Refills: 0 | Status: SHIPPED | OUTPATIENT
Start: 2024-06-07

## 2024-06-07 RX ADMIN — ACETAMINOPHEN 650 MG: 325 TABLET ORAL at 04:29

## 2024-06-07 RX ADMIN — DOCUSATE SODIUM 100 MG: 100 CAPSULE, LIQUID FILLED ORAL at 05:35

## 2024-06-07 RX ADMIN — METRONIDAZOLE 500 MG: 5 INJECTION, SOLUTION INTRAVENOUS at 05:32

## 2024-06-07 RX ADMIN — KETOROLAC TROMETHAMINE 15 MG: 15 INJECTION, SOLUTION INTRAMUSCULAR; INTRAVENOUS at 06:00

## 2024-06-07 SDOH — ECONOMIC STABILITY: TRANSPORTATION INSECURITY
IN THE PAST 12 MONTHS, HAS THE LACK OF TRANSPORTATION KEPT YOU FROM MEDICAL APPOINTMENTS OR FROM GETTING MEDICATIONS?: NO

## 2024-06-07 SDOH — ECONOMIC STABILITY: TRANSPORTATION INSECURITY
IN THE PAST 12 MONTHS, HAS LACK OF RELIABLE TRANSPORTATION KEPT YOU FROM MEDICAL APPOINTMENTS, MEETINGS, WORK OR FROM GETTING THINGS NEEDED FOR DAILY LIVING?: NO

## 2024-06-07 ASSESSMENT — FIBROSIS 4 INDEX: FIB4 SCORE: 0.37

## 2024-06-07 ASSESSMENT — SOCIAL DETERMINANTS OF HEALTH (SDOH)
IN THE PAST 12 MONTHS, HAS THE ELECTRIC, GAS, OIL, OR WATER COMPANY THREATENED TO SHUT OFF SERVICE IN YOUR HOME?: NO
WITHIN THE PAST 12 MONTHS, YOU WORRIED THAT YOUR FOOD WOULD RUN OUT BEFORE YOU GOT THE MONEY TO BUY MORE: NEVER TRUE
WITHIN THE PAST 12 MONTHS, THE FOOD YOU BOUGHT JUST DIDN'T LAST AND YOU DIDN'T HAVE MONEY TO GET MORE: NEVER TRUE

## 2024-06-07 ASSESSMENT — PAIN DESCRIPTION - PAIN TYPE: TYPE: CHRONIC PAIN

## 2024-06-07 NOTE — PROGRESS NOTES
Accepted patient from PACU. He was able to walk from [facility] to room. His gait was steady. He presented drowsy from anesthesia. He was able to answer questions appropriately. He stated he did not feel any pain at the moment and denied any nausea. His laparoscopic sites were clean dry and intact with dermabond and open to air. He was on 2L via nasal cannula with saturation of 95%.

## 2024-06-07 NOTE — OR NURSING
2217 Pt arrived from OR post APPENDECTOMY, LAPAROSCOPIC , report received. Pt breathing spontaneous and unlabored with OPA. Dressing CLAUDIA without drainage.     1054 Pt wife updated on progress, POC and ETA for DC Pt states pain is controled and tolerable, denies nausea. Pt tolerating PO fluids. Report to Luis ISBELL Pt taken to room by RN in stable condition on 2 lpm O2 via NC with portable tank @ ful psi

## 2024-06-07 NOTE — DISCHARGE INSTRUCTIONS
Laparoscopic Appendectomy D/C instructions:    DIET: Upon discharge from the hospital you may resume your normal pre-operative diet. Depending on how you are feeling and whether you have nausea or not, you may wish to stay with a bland diet for the first few days. However, you can advance this as quickly as you feel ready.    ACTIVITIES: After discharge from the hospital, you may resume full routine activities. However, there should be no heavy lifting (greater than 15 pounds) and no strenuous activities until after your follow-up visit. Otherwise, routine activities of daily living are acceptable.    DRIVING: You may drive whenever you are off pain medications and are able to perform the activities needed to drive, i.e. turning, bending, twisting, etc.    BATHING: You may get the wound wet at any time after leaving the hospital. You may shower, but do not submerge in a bath for at least a week. Dressings may come off after 48 hours.    BOWEL FUNCTION: A few patients, after this operation, will develop either frequent or loose stools after meals. This usually corrects itself after a few days, to a few weeks. If this occurs, do not worry; it is not unusual and will resolve. Much more common than loose stools, is constipation. The combination of pain medication and decreased activity level can cause constipation in otherwise normal patients. If you feel this is occurring, take a laxative (Milk of Magnesia, Ex-Lax, Senokot, etc.) until the problem has resolved.    PAIN MEDICATION: You will be given a prescription for pain medication at discharge. Please take these as directed. It is important to remember not to take medications on an empty stomach as this may cause nausea.    CALL IF YOU HAVE: (1) Fevers to more than 1010 F, (2) Unusual chest or leg pain, (3) Drainage or fluid from incision that may be foul smelling, increased tenderness or soreness at the wound or the wound edges are no longer together, redness or  swelling at the incision site. Please do not hesitate to call with any other questions.     APPOINTMENT: Contact our office at [phone] for a follow-up appointment in 1 week following your procedure.    If you have any additional questions, please do not hesitate to call the office.    Office address:   [address], [address] [place] [address]

## 2024-06-07 NOTE — DISCHARGE SUMMARY
Discharge Summary    CHIEF COMPLAINT ON ADMISSION  Chief Complaint   Patient presents with    Abdominal Pain     C/o generalized abd pain and more intense RLQ abd pain starting yesterday and has been progressively worsening. +N/V, + 3 emesis this afternoon, and 1x/hr from 8pm to 0400. +fever today 100f. No abd surgical hx.        Reason for Admission  Abdominal Pain; N/V     Admission Date  6/6/2024    CODE STATUS  Full Code    HPI & HOSPITAL COURSE  This is a 38 y.o. male here with acute appendicitis, underwent laparoscopic appendectomy on 6/6/24. Doing well on post-op day one and ready for discharge     Therefore, he is discharged in good and stable condition to home with close outpatient follow-up.    The patient recovered much more quickly than anticipated on admission.    Discharge Date  06/07/24    FOLLOW UP ITEMS POST DISCHARGE  none    DISCHARGE DIAGNOSES  Principal Problem:    Acute appendicitis (POA: Yes)  Active Problems:    Postoperative pain (POA: Unknown)  Resolved Problems:    * No resolved hospital problems. *      FOLLOW UP  No future appointments.  Kulwinder Haney D.O.  [address]  [place]   [place] [address]  [phone]    Schedule an appointment as soon as possible for a visit in 2 week(s)  For wound re-check      MEDICATIONS ON DISCHARGE     Medication List        START taking these medications        Instructions   acetaminophen 325 MG Tabs  Commonly known as: Tylenol   Take 2 Tablets by mouth every 6 hours. Alternate w/ ibuprofen to take a medication every 3 hrs, take scheduled for 3 days post-op then PRN  Dose: 650 mg     ibuprofen 600 MG Tabs  Commonly known as: Motrin   Take 1 Tablet by mouth every 6 hours. Alternate w/ tylenol to take a medication every 3 hrs, take scheduled for 3 days post-op then PRN  Dose: 600 mg     oxyCODONE immediate-release 5 MG Tabs  Commonly known as: Roxicodone   Take 1 Tablet by mouth every four hours as needed for Severe Pain for up to 3 days.  Dose: 5  mg     polyethylene glycol/lytes Pack  Commonly known as: Miralax   Take 1 Packet by mouth every day. While taking narcotics, hold if greater then 3 BMs a day  Dose: 17 g            STOP taking these medications      Ibuprofen-Acetaminophen 125-250 MG Tabs              Allergies  Allergies   Allergen Reactions    Milk-Related Compounds Unspecified     Stomach ache       DIET  No orders of the defined types were placed in this encounter.      ACTIVITY  As tolerated.  Weight bearing as tolerated    CONSULTATIONS  none    PROCEDURES  6/6/24 - Laparoscopic Appendectomy with Dr. Haney    LABORATORY  Lab Results   Component Value Date    SODIUM 139 06/07/2024    POTASSIUM 4.0 06/07/2024    CHLORIDE 103 06/07/2024    CO2 23 06/07/2024    GLUCOSE 123 (H) 06/07/2024    BUN 13 06/07/2024    CREATININE 0.95 06/07/2024        Lab Results   Component Value Date    WBC 13.6 (H) 06/07/2024    HEMOGLOBIN 14.5 06/07/2024    HEMATOCRIT 42.4 06/07/2024    PLATELETCT 263 06/07/2024        Total time of the discharge process exceeds 20 minutes.

## 2024-06-07 NOTE — PROGRESS NOTES
4 Eyes Skin Assessment Completed by Luis RN and Ma, RN.    Head WDL  Ears WDL  Nose WDL  Mouth WDL  Neck WDL  Breast/Chest WDL  Shoulder Blades WDL  Spine WDL  (R) Arm/Elbow/Hand WDL  (L) Arm/Elbow/Hand WDL  Abdomen Incision 3 lap sites closed with dermabond and open to air.  Groin WDL  Scrotum/Coccyx/Buttocks WDL  (R) Leg WDL  (L) Leg WDL  (R) Heel/Foot/Toe WDL  (L) Heel/Foot/Toe WDL          Devices In Places Blood Pressure Cuff, Pulse Ox, and Nasal Cannula      Interventions In Place NC W/Ear Foams    Possible Skin Injury No    Pictures Uploaded Into Epic N/A  Wound Consult Placed N/A  RN Wound Prevention Protocol Ordered No

## 2024-06-07 NOTE — CARE PLAN
The patient is Stable - Low risk of patient condition declining or worsening    Shift Goals  Clinical Goals: Pain management, IV fluids, Free from falls  Patient Goals: Comfort, Rest    Progress made toward(s) clinical / shift goals:  The patient understands his condition and the procedure he had. He denies pain. He asked about movement and activity restrictions and was told he'd receive specific instructions on discharge. He was advised to stop doing anything that caused pain and notify the nurse. He was asked to use the call light to receive assistance with getting out of bed and has been compliant with that request.     Patient is not progressing towards the following goals:

## 2024-06-07 NOTE — ANESTHESIA PREPROCEDURE EVALUATION
Case: [id number] Anesthesia Start Date/Time: 06/06/24 2117    Procedure: APPENDECTOMY, LAPAROSCOPIC    Location:  OR 01 / SURGERY [facility]    Surgeons: Kulwinder Haney D.O.            Relevant Problems   Other   (positive) Acute appendicitis       Physical Exam    Airway   Mallampati: II  TM distance: >3 FB  Neck ROM: full       Cardiovascular - normal exam  Rhythm: regular  Rate: normal  (-) murmur     Dental - normal exam           Pulmonary - normal exam  Breath sounds clear to auscultation     Abdominal    Neurological - normal exam                   Anesthesia Plan    ASA 2       Plan - general       Airway plan will be ETT          Induction: intravenous    Postoperative Plan: Postoperative administration of opioids is intended.    Pertinent diagnostic labs and testing reviewed    Informed Consent:    Anesthetic plan and risks discussed with patient.    Use of blood products discussed with: patient whom consented to blood products.

## 2024-06-07 NOTE — ANESTHESIA PROCEDURE NOTES
Airway    Date/Time: 6/6/2024 9:24 PM    Performed by: Kulwinder Goncalves M.D.  Authorized by: Kulwinder Goncalves M.D.    Location:  OR  Urgency:  Elective  Indications for Airway Management:  Anesthesia      Spontaneous Ventilation: absent    Sedation Level:  Deep  Preoxygenated: Yes    Patient Position:  Sniffing  Mask Difficulty Assessment:  2 - vent by mask + OA or adjuvant +/- NMBA  Final Airway Type:  Endotracheal airway  Final Endotracheal Airway:  ETT  Cuffed: Yes    Technique Used for Successful ETT Placement:  Video laryngoscopy    Insertion Site:  Oral  Blade Type:  Glide  Laryngoscope Blade/Videolaryngoscope Blade Size:  4  ETT Size (mm):  7.5  Measured from:  Lips  Placement Verified by: auscultation and capnometry    Cormack-Lehane Classification:  Grade I - full view of glottis  Number of Attempts at Approach:  1  Number of Other Approaches Attempted:  1  Unsuccessful Approach(es) for ETT:  Direct laryngoscopy   Grade 3 view with MAC 3, grade 1 view with glidescope 4.

## 2024-06-07 NOTE — ANESTHESIA POSTPROCEDURE EVALUATION
Patient: Carlos Whyte    Procedure Summary       Date: 06/06/24 Room / Location:  OR  / SURGERY [facility]    Anesthesia Start: 2117 Anesthesia Stop: 2225    Procedure: APPENDECTOMY, LAPAROSCOPIC (Abdomen) Diagnosis: (Appendicitis)    Surgeons: Kulwinder Haney D.O. Responsible Provider: Kulwinder Goncalves M.D.    Anesthesia Type: general ASA Status: 2            Final Anesthesia Type: general  Last vitals  BP   Blood Pressure: 111/79    Temp   37 °C (98.6 °F)    Pulse   83   Resp   16    SpO2   98 %      Anesthesia Post Evaluation    Patient location during evaluation: PACU  Patient participation: complete - patient participated  Level of consciousness: sleepy but conscious  Pain score: 3    Airway patency: patent  Anesthetic complications: no  Cardiovascular status: hemodynamically stable  Respiratory status: acceptable  Hydration status: euvolemic    PONV: none          There were no known notable events for this encounter.

## 2024-06-07 NOTE — PROGRESS NOTES
"       S: Carlos Whyte  is a 38 y.o.  male admitted for appendicitis doing well on pod 1      O:  /79   Pulse 71   Temp 36.1 °C (96.9 °F) (Temporal)   Resp 18   Ht 1.778 m (5' 10\")   Wt 122 kg (268 lb 15.4 oz)   SpO2 95%   Intake/Output                               06/05/24 0700 - 06/06/24 0659 (Not Admitted) 06/06/24 0700 - 06/07/24 0659 06/07/24 0700 - 06/08/24 0659     0700-1859 1900-0659 Total 0837-0191 1298-6920 Total [id number] 1900-0659 Total                    Intake    P.O.  --  -- --  --  160 160  --  -- --    P.O. -- -- -- -- 160 160 -- -- --    I.V.  --  -- --  --  1200 1200  --  -- --    Volume (mL) (lactated ringers infusion) -- -- -- -- 1200 1200 -- -- --    IV Piggyback  --  -- --  100  -- 100  --  -- --    Volume (mL) (metroNIDAZOLE (Flagyl) IVPB 500 mg) -- -- -- 100 -- 100 -- -- --    Total Intake -- -- -- 100 1360 1460 -- -- --       Output    Urine  --  -- --  --  1000 1000  --  -- --    Number of Times Voided -- -- -- -- 2 x 2 x -- -- --    Urine Void (mL) -- -- -- -- 1000 1000 -- -- --    Blood  --  -- --  --  10 10  --  -- --    Est. Blood Loss -- -- -- -- 10 10 -- -- --    Total Output -- -- -- -- 1010 1010 -- -- --       Net I/O     -- -- -- 100 350 450 -- -- --          Recent Labs     06/06/24  1522 06/07/24  0134   SODIUM 138 139   POTASSIUM 3.8 4.0   CHLORIDE 102 103   CO2 22 23   GLUCOSE 108* 123*   BUN 14 13   CREATININE 0.86 0.95   CALCIUM 9.1 8.6     Recent Labs     06/06/24  1522 06/07/24  0134   WBC 18.1* 13.6*   RBC 5.27 4.75   HEMOGLOBIN 15.8 14.5   HEMATOCRIT 46.7 42.4   MCV 88.6 89.3   MCH 30.0 30.5   MCHC 33.8 34.2   RDW 39.1 41.1   PLATELETCT 294 263   MPV 9.9 10.4       Alert and Oriented x3, No Acute Distress  Normal Respiratory Effort  Abdomen soft, appropriately tender  Incisions/Bandages clean/dry/intact  Extremities warm and well perfused    A/P:  Ready for DC    Darío Soni MD  [facility]    "

## 2024-06-07 NOTE — H&P
"          CHIEF COMPLAINT: Right lower quadrant abdominal pain.     HISTORY OF PRESENT ILLNESS: The patient is a 38-year-old female came to the emergency department for evaluation of abdominal pain.  Pain started yesterday and is gradually gotten worse and localized to the right lower quadrant.  At this point he is unable to move much without significant discomfort.  Is companied by nausea and vomiting.  He denies constipation or diarrhea.  He denies blood per rectum.  He denies shortness of breath cough or sputum production.  He denies frequency urgency or dysuria.  CT scan showed appendicitis and surgery was consulted.    PAST MEDICAL HISTORY:  has no past medical history on file.    PAST SURGICAL HISTORY:  has no past surgical history on file.    ALLERGIES:   Allergies   Allergen Reactions    Milk-Related Compounds Unspecified     Stomach ache       CURRENT MEDICATIONS:   Home Medications       Reviewed by Karthikeyan PutnamD (Pharmacist) on 06/06/24 at 1828  Med List Status: Complete     Medication Last Dose Status   Ibuprofen-Acetaminophen 125-250 MG Tab PRN Active                  Audit from Redirected Encounters    **Home medications have not yet been reviewed for this encounter**       FAMILY HISTORY: family history is not on file.    SOCIAL HISTORY:  reports that he has never smoked. He has never used smokeless tobacco. He reports that he does not currently use alcohol. He reports that he does not use drugs.    REVIEW OF SYSTEMS: Review of systems is remarkable for the following abdominal pain nausea and vomiting. The remainder of the comprehensive ROS is negative with the exception of the aforementioned HPI, PMH, and PSH bullets in accordance with CMS guideline.    PHYSICAL EXAMINATION:      Vital Signs: /82   Pulse 86   Temp 36.6 °C (97.8 °F) (Temporal)   Resp 18   Ht 1.778 m (5' 10\")   Wt 121 kg (266 lb 1.5 oz)   SpO2 98%   Physical Exam  Vitals and nursing note reviewed.   HENT:      Head: " Normocephalic and atraumatic.      Nose: Nose normal.      Mouth/Throat:      Mouth: Mucous membranes are moist.      Pharynx: Oropharynx is clear.   Eyes:      Extraocular Movements: Extraocular movements intact.      Conjunctiva/sclera: Conjunctivae normal.   Cardiovascular:      Rate and Rhythm: Normal rate and regular rhythm.      Pulses: Normal pulses.   Pulmonary:      Effort: Pulmonary effort is normal.      Breath sounds: Normal breath sounds. No stridor.   Abdominal:      Palpations: Abdomen is soft.      Comments: Tender in the right lower quadrant with rebound and guarding with a positive Rovsing sign.  No scars or hernias.   Musculoskeletal:         General: Normal range of motion.      Cervical back: Normal range of motion.      Right lower leg: No edema.      Left lower leg: No edema.   Skin:     General: Skin is warm and dry.      Coloration: Skin is not jaundiced.   Neurological:      General: No focal deficit present.      Mental Status: He is alert and oriented to person, place, and time.         LABORATORY VALUES:   Recent Labs     06/06/24  1522   WBC 18.1*   RBC 5.27   HEMOGLOBIN 15.8   HEMATOCRIT 46.7   MCV 88.6   MCH 30.0   MCHC 33.8   RDW 39.1   PLATELETCT 294   MPV 9.9     Recent Labs     06/06/24  1522   SODIUM 138   POTASSIUM 3.8   CHLORIDE 102   CO2 22   GLUCOSE 108*   BUN 14   CREATININE 0.86   CALCIUM 9.1     Recent Labs     06/06/24  1522   ASTSGOT 13   ALTSGPT 22   TBILIRUBIN 0.9   ALKPHOSPHAT 103*   GLOBULIN 3.3            IMAGING:   CT-ABDOMEN-PELVIS WITH   Final Result      Dilated fluid-filled appendix with surrounding inflammatory change consistent with acute appendicitis.          ASSESSMENT AND PLAN:   38-year-old male with history, physical examination and workup consistent with acute appendicitis.  Patient is being admitted to the surgical service.  He will undergo laparoscopic appendectomy.  Depending on findings, patient may need to remain in the hospital for antibiotics  but that remains to be seen.  I described the procedure of laparoscopic appendectomy including its steps and attendant risk which include but not limited to: Wound infection, organ space infection, organ injury, bleeding, need to convert to open procedure, incisional hernia, recurrent abscess, cardiopulmonary complications of anesthesia.    Consent will be signed       ____________________________________     Kulwinder Haney D.O.    DD: 6/6/2024  7:49 PM

## 2024-06-07 NOTE — ED NOTES
Medication history reviewed with patient at bedside.     Allergies reviewed    Patient denied any outpatient antibiotics in the last 30 days.    Any anticoagulants taken in the last 14 days? No             Erma Burton, Pharm.D., [facility]  ER Clinical Pharmacist

## 2024-06-07 NOTE — OP REPORT
Operative Report    PreOp Diagnosis: Acute appendicitis    PostOp Diagnosis: Acute appendicitis with gangrene but no perforation or abscess      Procedure(s):  APPENDECTOMY, LAPAROSCOPIC - Wound Class: Clean contaminated    Surgeon(s):  Kulwinder Haney D.O.    Anesthesiologist/Type of Anesthesia:  Anesthesiologist: Kulwinder Goncalves M.D./General    Surgical Staff:  Circulator: Jackie Medina R.N.  Scrub Person: Juan Carlos Chacon    Specimens removed if any:  ID Type Source Tests Collected by Time Destination   1 : 1. Appendix Tissue Appendix AFB CULTURE, FUNGAL CULTURE, AEROBIC/ANAEROBIC CULTURE (SURGERY) Kulwinder Haney D.O. 6/6/2024  9:58 PM    A : A. Appendix Tissue Appendix PATHOLOGY SPECIMEN Kulwinder Haney D.O. 6/6/2024  9:38 PM        Estimated Blood Loss: Minimal    Findings: Diffusely enlarged and firm appendix with areas of gangrene but no obvious perforation or periappendiceal abscess    Complications: None noted    Indication: 38-year-old male with history, physical examination and workup consistent with acute appendicitis    OPERATIVE REPORT: The patient was brought to the operating room and placed in  supine position on the operating table. After adequate general anesthesia, the abdomen was prepped and draped in standard fashion. An area inferior to the umbilucus was infiltrated with 0.25% bupivacaine. An incision was made through the skin and subcutaneous tissues. We then bluntly dissected down to the anterior fascia, which was elevated into the wound using a Kocher clamp. A stay suture of 0 Vicryl was then placed. The fascia was then incised and we dissected through the abdominal wall in layers until the peritoneum was entered. We then placed the Lydia port and insufflated the abdomen. Two areas of the Left lower quadrant was chosen for 5 mm ports. The skin and subcutaneous tissue were infiltrated with 0.25% bupivacaine. A small incision was made and the 5 mm ports were inserted under direct camera  observation.  The appendix and mesoappendix were clearly diffusely inflamed.  There was a fair amount of suppurative reaction noted.  There were adhesions to the terminal ileum and retroperitoneum in that area.  These were mobilized bluntly in order to view the base of the appendix.  Once we did this it was clear that there was patchy areas of necrosis along the length of the appendix which was dilated.  There was no obvious areas of perforation.It was carefully grasped and held anteriorly. The mesoappendix was divided using the ligasure. The appendix was then amputated using a joanie stapler and placed in an endocatch bag. We then irrigated the right lower in the right upper quadrant until the effluent was clear.The ports were then removed. The fascia at the umbilical port site was closed using the stay suture placed at the beginning of the case. The wounds were then irrigated and the skin was closedusing a 4-0 Monocryl stitch in a subcuticular fashion. The area was then cleaned and dried. Dermabond was applied. The patient was then awakened from anesthesia and taken to post-anesthesia care unit in stable condition. The sponge, needle, and instrument count was correct at the end of the case and I was present for the entirety of the case.      6/6/2024 10:07 PM Kulwinder Haney D.O.

## 2024-06-07 NOTE — ANESTHESIA TIME REPORT
Anesthesia Start and Stop Event Times       Date Time Event    6/6/2024 2105 Ready for Procedure     2117 Anesthesia Start     2225 Anesthesia Stop          Responsible Staff  06/06/24      Name Role Begin End    Kulwinder Goncalves M.D. Anesth 2117 2225          Overtime Reason:  no overtime (within assigned shift)    Comments:

## 2024-06-09 LAB
FUNGUS SPEC FUNGUS STN: NORMAL
SIGNIFICANT IND 70042: NORMAL
SITE SITE: NORMAL
SOURCE SOURCE: NORMAL

## 2024-06-10 LAB
BACTERIA WND AEROBE CULT: NORMAL
GRAM STN SPEC: NORMAL
SIGNIFICANT IND 70042: NORMAL
SITE SITE: NORMAL
SOURCE SOURCE: NORMAL

## 2024-06-11 LAB
FUNGUS SPEC CULT: NORMAL
FUNGUS SPEC FUNGUS STN: NORMAL
SIGNIFICANT IND 70042: NORMAL
SITE SITE: NORMAL
SOURCE SOURCE: NORMAL

## 2024-06-12 LAB
BACTERIA SPEC ANAEROBE CULT: NORMAL
SIGNIFICANT IND 70042: NORMAL
SITE SITE: NORMAL
SOURCE SOURCE: NORMAL

## 2025-07-25 ENCOUNTER — OFFICE VISIT (OUTPATIENT)
Dept: URGENT CARE | Facility: CLINIC | Age: 40
End: 2025-07-25
Payer: COMMERCIAL

## 2025-07-25 VITALS
RESPIRATION RATE: 17 BRPM | WEIGHT: 269 LBS | DIASTOLIC BLOOD PRESSURE: 64 MMHG | BODY MASS INDEX: 38.51 KG/M2 | TEMPERATURE: 97.7 F | SYSTOLIC BLOOD PRESSURE: 110 MMHG | HEART RATE: 90 BPM | HEIGHT: 70 IN | OXYGEN SATURATION: 97 %

## 2025-07-25 DIAGNOSIS — R05.1 ACUTE COUGH: Primary | ICD-10-CM

## 2025-07-25 DIAGNOSIS — J02.9 PHARYNGITIS, UNSPECIFIED ETIOLOGY: ICD-10-CM

## 2025-07-25 DIAGNOSIS — U07.1 COVID: ICD-10-CM

## 2025-07-25 LAB
FLUAV RNA SPEC QL NAA+PROBE: NEGATIVE
FLUBV RNA SPEC QL NAA+PROBE: NEGATIVE
RSV RNA SPEC QL NAA+PROBE: NEGATIVE
SARS-COV-2 RNA RESP QL NAA+PROBE: POSITIVE

## 2025-07-25 RX ORDER — DEXTROMETHORPHAN HYDROBROMIDE AND PROMETHAZINE HYDROCHLORIDE 15; 6.25 MG/5ML; MG/5ML
5 SYRUP ORAL
Qty: 118 ML | Refills: 0 | Status: SHIPPED | OUTPATIENT
Start: 2025-07-25

## 2025-07-25 RX ORDER — BENZONATATE 100 MG/1
100 CAPSULE ORAL 3 TIMES DAILY PRN
Qty: 60 CAPSULE | Refills: 0 | Status: SHIPPED | OUTPATIENT
Start: 2025-07-25

## 2025-07-25 RX ORDER — DEXAMETHASONE SODIUM PHOSPHATE 10 MG/ML
10 INJECTION, SOLUTION INTRA-ARTICULAR; INTRALESIONAL; INTRAMUSCULAR; INTRAVENOUS; SOFT TISSUE ONCE
Status: COMPLETED | OUTPATIENT
Start: 2025-07-25 | End: 2025-07-25

## 2025-07-25 RX ADMIN — DEXAMETHASONE SODIUM PHOSPHATE 10 MG: 10 INJECTION, SOLUTION INTRA-ARTICULAR; INTRALESIONAL; INTRAMUSCULAR; INTRAVENOUS; SOFT TISSUE at 13:44

## 2025-07-25 ASSESSMENT — FIBROSIS 4 INDEX: FIB4 SCORE: 0.42

## 2025-07-25 ASSESSMENT — ENCOUNTER SYMPTOMS
DIARRHEA: 0
SORE THROAT: 1
FEVER: 0
VOMITING: 0
COUGH: 1
NAUSEA: 0
CHILLS: 1
HEADACHES: 0
MYALGIAS: 1
DIZZINESS: 0
SWEATS: 1
SHORTNESS OF BREATH: 0
WEAKNESS: 0

## 2025-07-25 ASSESSMENT — COPD QUESTIONNAIRES: COPD: 0

## 2025-07-25 NOTE — PROGRESS NOTES
"Subjective     Carlos Whyte is a 39 y.o. male who presents with Flu Like Symptoms (X3 days now with poke in throat\" coughing, weird taste in mouth, chest congestion, body aches and some body pain )            Many of his coworkers are sick with similar symptoms  He has a school-age son whom he recently talked to the [employer] prior to his symptoms starting    Cough  This is a new problem. The current episode started in the past 7 days. The problem has been gradually worsening. Associated symptoms include chills, myalgias, nasal congestion, a sore throat and sweats. Pertinent negatives include no chest pain, fever, headaches, rash or shortness of breath. Treatments tried: tylenol. The treatment provided mild relief. There is no history of asthma, COPD or pneumonia.       Review of Systems   Constitutional:  Positive for chills. Negative for fever and malaise/fatigue.   HENT:  Positive for sore throat. Negative for congestion.    Respiratory:  Positive for cough. Negative for shortness of breath.    Cardiovascular:  Negative for chest pain.   Gastrointestinal:  Negative for diarrhea, nausea and vomiting.   Musculoskeletal:  Positive for myalgias.   Skin:  Negative for rash.   Neurological:  Negative for dizziness, weakness and headaches.   All other systems reviewed and are negative.             Objective     /64 (BP Location: Left arm, Patient Position: Sitting, BP Cuff Size: Large adult)   Pulse 90   Temp 36.5 °C (97.7 °F) (Temporal)   Resp 17   Ht 1.778 m (5' 10\")   Wt 122 kg (269 lb)   SpO2 97%   BMI 38.60 kg/m²      Physical Exam  Vitals reviewed.   Constitutional:       Appearance: Normal appearance. He is ill-appearing.   HENT:      Head: Normocephalic and atraumatic.      Right Ear: Tympanic membrane and ear canal normal.      Left Ear: Tympanic membrane and ear canal normal.      Nose: Congestion present.      Mouth/Throat:      Mouth: Mucous membranes are moist.      Pharynx: Oropharynx " is clear. Posterior oropharyngeal erythema present.   Eyes:      Conjunctiva/sclera: Conjunctivae normal.   Cardiovascular:      Rate and Rhythm: Normal rate and regular rhythm.      Pulses: Normal pulses.      Heart sounds: Normal heart sounds.   Pulmonary:      Effort: Pulmonary effort is normal.      Breath sounds: Normal breath sounds.   Musculoskeletal:         General: Normal range of motion.   Skin:     General: Skin is warm and dry.   Neurological:      Mental Status: He is alert and oriented to person, place, and time.   Psychiatric:         Behavior: Behavior normal.         Judgment: Judgment normal.                           Results for orders placed or performed in visit on 07/25/25   POCT CoV-2, Flu A/B, RSV by PCR    Collection Time: 07/25/25  1:33 PM   Result Value Ref Range    SARS-CoV-2 by PCR Positive (A) Negative, Invalid    Influenza virus A RNA Negative Negative, Invalid    Influenza virus B, PCR Negative Negative, Invalid    RSV, PCR Negative Negative, Invalid            Assessment & Plan  Acute cough    Orders:    POCT CoV-2, Flu A/B, RSV by PCR    dexamethasone (Decadron) injection (check route below) 10 mg    benzonatate (TESSALON) 100 MG Cap; Take 1 Capsule by mouth 3 times a day as needed for Cough.    promethazine-dextromethorphan (PROMETHAZINE-DM) 6.25-15 MG/5ML syrup; Take 5 mL by mouth at bedtime as needed for Cough.    COVID       Updated via [employer] that he tested positive for COVID-19.    Carlos can continue supportive care that was discussed in clinic such as alternating ibuprofen and acetaminophen, rest, plenty of fluids such as water, Pedialyte, low sugar Gatorade, broth, hot steamy showers, hot tea with honey, cough drops/throat spray, and a cool-mist humidifier by bed at night.    Work not provided.    Shared decision-making was utilized with Carlos for plan of care. Discussed differential diagnoses, natural history, and supportive care. Reviewed indication for use and the  potential benefits and side effects of medications. Carlos was encouraged to monitor symptoms closely and educated about signs and symptoms that would warrant concern and mandate seeking a higher level of service through the emergency department discussed at length. All questions answered to Carlos's satisfaction and they were in agreement with treatment plan at time of discharge.    Pharyngitis, unspecified etiology    Orders:    dexamethasone (Decadron) injection (check route below) 10 mg

## 2025-07-25 NOTE — LETTER
July 25, 2025    To Whom It May Concern:         This is confirmation that Carlos Whyte attended his scheduled appointment with NORMA Hernandez on 7/25/25. Please excuse him from work 7/25/2025.       Sincerely,          GAMAL Hernandez.  [phone]

## (undated) DEVICE — SET EXTENSION WITH 2 PORTS (48EA/CA) ***PART #2C8610 IS A SUBSTITUTE*****

## (undated) DEVICE — SENSOR OXIMETER ADULT SPO2 RD SET (20EA/BX)

## (undated) DEVICE — ELECTRODE DUAL RETURN W/ CORD - (50/PK)

## (undated) DEVICE — SUTURE 4-0 MONOCRYL PLUS PS-2 - 27 INCH (36/BX)

## (undated) DEVICE — SUTURE GENERAL

## (undated) DEVICE — SUCTION INSTRUMENT YANKAUER BULBOUS TIP W/O VENT (50EA/CA)

## (undated) DEVICE — ANTI-FOG SOLUTION - 60BTL/CA

## (undated) DEVICE — TROCAR 5X100 NON BLADED Z-TH - READ KII (6/BX)

## (undated) DEVICE — Device

## (undated) DEVICE — TROCAR LAPSCP 100MM 12MM NTHRD - (6/BX)

## (undated) DEVICE — DERMABOND ADVANCED - (12EA/BX)

## (undated) DEVICE — SET LEADWIRE 5 LEAD BEDSIDE DISPOSABLE ECG (1SET OF 5/EA)

## (undated) DEVICE — CANNULA W/SEAL 5X100 Z-THRE - ADED KII (12/BX)

## (undated) DEVICE — LIGASURE LAPAROSCOPIC 5MM - (6EA/CA)

## (undated) DEVICE — BAG RETRIEVAL 10ML (10EA/BX)

## (undated) DEVICE — COVER LIGHT HANDLE ALC PLUS DISP (18EA/BX)

## (undated) DEVICE — GOWN WARMING STANDARD FLEX - (30/CA)

## (undated) DEVICE — SODIUM CHL IRRIGATION 0.9% 1000ML (12EA/CA)

## (undated) DEVICE — GLOVE BIOGEL PI ORTHO SZ 8 PF LF (40PR/BX)

## (undated) DEVICE — CHLORAPREP 26 ML APPLICATOR - ORANGE TINT(25/CA)

## (undated) DEVICE — CANISTER SUCTION 3000ML MECHANICAL FILTER AUTO SHUTOFF MEDI-VAC NONSTERILE LF DISP  (40EA/CA)

## (undated) DEVICE — SUTURE 0 VICRYL PLUS UR-6 - 27 INCH (36/BX)

## (undated) DEVICE — DRAPESURG STERI-DRAPE LONG - (10/BX 4BX/CA)

## (undated) DEVICE — SLEEVE, VASO, THIGH, MED

## (undated) DEVICE — TUBING CLEARLINK DUO-VENT - C-FLO (48EA/CA)

## (undated) DEVICE — SET SUCTION/IRRIGATION WITH DISPOSABLE TIP (6/CA )PART #0250-070-520 IS A SUB

## (undated) DEVICE — GLOVE BIOGEL SZ 8 SURGICAL PF LTX - (50PR/BX 4BX/CA)

## (undated) DEVICE — SET TUBING PNEUMOCLEAR HIGH FLOW SMOKE EVACUATION (10EA/BX)

## (undated) DEVICE — LACTATED RINGERS INJ 1000 ML - (14EA/CA 60CA/PF)